# Patient Record
Sex: FEMALE | Race: WHITE | NOT HISPANIC OR LATINO
[De-identification: names, ages, dates, MRNs, and addresses within clinical notes are randomized per-mention and may not be internally consistent; named-entity substitution may affect disease eponyms.]

---

## 2023-04-18 PROBLEM — Z00.00 ENCOUNTER FOR PREVENTIVE HEALTH EXAMINATION: Status: ACTIVE | Noted: 2023-04-18

## 2023-04-24 ENCOUNTER — NON-APPOINTMENT (OUTPATIENT)
Age: 33
End: 2023-04-24

## 2023-04-24 ENCOUNTER — APPOINTMENT (OUTPATIENT)
Dept: HEART AND VASCULAR | Facility: CLINIC | Age: 33
End: 2023-04-24
Payer: MEDICAID

## 2023-04-24 VITALS
SYSTOLIC BLOOD PRESSURE: 122 MMHG | BODY MASS INDEX: 18.78 KG/M2 | RESPIRATION RATE: 14 BRPM | HEIGHT: 64 IN | OXYGEN SATURATION: 100 % | HEART RATE: 80 BPM | WEIGHT: 110 LBS | DIASTOLIC BLOOD PRESSURE: 77 MMHG

## 2023-04-24 DIAGNOSIS — R94.31 ABNORMAL ELECTROCARDIOGRAM [ECG] [EKG]: ICD-10-CM

## 2023-04-24 PROCEDURE — 93000 ELECTROCARDIOGRAM COMPLETE: CPT

## 2023-04-24 PROCEDURE — 99203 OFFICE O/P NEW LOW 30 MIN: CPT | Mod: 25

## 2023-04-24 NOTE — HISTORY OF PRESENT ILLNESS
[FreeTextEntry1] : ENDY IBARRA is a 32 year y.o. F with medical history significant for hyperthyroidism and anxiety disorder.\par She is here for cardiac evaluation of palpitations\par She reports having palpitations for >1 yr. \par She states she recently saw her endocrinologist who said her thyroid levels were okay. \par She would like to know whether it is okay for her to take beta blockers. \par Symptoms occur almost daily. Sometimes lasts a few minutes but sometimes can last up to hours. A/w LH. No syncope. Usually associated with feeling anxious. \par Denies CP, SOB, orthopnea, dizziness, syncope, LH, CAMPBELL, edema \par Patient denies changes in her exercise tolerance during the past 6 months. She can walk >10 blocks and can climb 3-4 flights of stairs. \par Sleeps with 1 pillow\par \par She denies history of MI, CVA, DM. \par Denies family history of premature ASCVD and /or SCD\par \par No hospitalizations in the past 3 years.\par \par \par DATA \par ECG (4/24/23): NSR at 87 bpm w nl axis, short Berny and subtle nonspecific ST depressions\par \par

## 2023-04-24 NOTE — ASSESSMENT
[FreeTextEntry1] : ENDY IBARRA is a 32 year F with past medical history significant for hyperthyroidism and anxiety. She is here for cardiac evaluation of palpitations, as described above. \par - I reviewed ECG \par - check cbc, cmp, TSH, \par - place a holter x 1 wk\par - Schedule an echo to evaluate for structural heart disease\par - Increase ambulation as tolerated to aim for approximately 30 minutes of moderate activity 5 days a week.  Heart healthy diet low in sodium, sugars and saturated fats and high in fruits, vegetables and whole grains.  \par - I counseled her on stress/anxiety management \par \par \par Patient advised to go to the nearest ED whenever any symptoms persist and/or worsens.  Patient/Family/Caregiver verbalized complete understanding of these instructions.\par \par I spent a total of 45 minutes with more than 50% spent on counseling and coordinating care.\par \par RTO after test results are available \par

## 2023-04-24 NOTE — PHYSICAL EXAM
[Well Developed] : well developed [Well Nourished] : well nourished [No Acute Distress] : no acute distress [Normal Venous Pressure] : normal venous pressure [No Carotid Bruit] : no carotid bruit [Normal S1, S2] : normal S1, S2 [No Murmur] : no murmur [No Rub] : no rub [No Gallop] : no gallop [Clear Lung Fields] : clear lung fields [Good Air Entry] : good air entry [No Respiratory Distress] : no respiratory distress  [Normal Gait] : normal gait [No Edema] : no edema [No Cyanosis] : no cyanosis [No Clubbing] : no clubbing [Moves all extremities] : moves all extremities [No Focal Deficits] : no focal deficits [Normal Speech] : normal speech [Alert and Oriented] : alert and oriented [Normal memory] : normal memory [Appears Anxious] : appears anxious

## 2023-04-24 NOTE — REVIEW OF SYSTEMS
[Palpitations] : palpitations [Fever] : no fever [Headache] : no headache [Chills] : no chills [Feeling Fatigued] : not feeling fatigued [SOB] : no shortness of breath [Dyspnea on exertion] : not dyspnea during exertion [Chest Discomfort] : no chest discomfort [Lower Ext Edema] : no extremity edema [Leg Claudication] : no intermittent leg claudication [Orthopnea] : no orthopnea [PND] : no PND [Syncope] : no syncope [Cough] : no cough [Wheezing] : no wheezing [Coughing Up Blood] : no hemoptysis [Convulsions] : no convulsions [Dizziness] : no dizziness [Limb Weakness (Paresis)] : no limb weakness (Paresis) [Confusion] : no confusion was observed [Easy Bleeding] : no tendency for easy bleeding

## 2023-05-08 ENCOUNTER — TRANSCRIPTION ENCOUNTER (OUTPATIENT)
Age: 33
End: 2023-05-08

## 2023-05-19 ENCOUNTER — OUTPATIENT (OUTPATIENT)
Dept: OUTPATIENT SERVICES | Facility: HOSPITAL | Age: 33
LOS: 1 days | End: 2023-05-19
Payer: MEDICAID

## 2023-05-19 DIAGNOSIS — R94.31 ABNORMAL ELECTROCARDIOGRAM [ECG] [EKG]: ICD-10-CM

## 2023-05-19 DIAGNOSIS — R00.2 PALPITATIONS: ICD-10-CM

## 2023-05-19 PROCEDURE — 93306 TTE W/DOPPLER COMPLETE: CPT

## 2023-05-19 PROCEDURE — 93306 TTE W/DOPPLER COMPLETE: CPT | Mod: 26

## 2023-05-19 PROCEDURE — 76377 3D RENDER W/INTRP POSTPROCES: CPT | Mod: 26

## 2023-05-22 ENCOUNTER — APPOINTMENT (OUTPATIENT)
Dept: HEART AND VASCULAR | Facility: CLINIC | Age: 33
End: 2023-05-22
Payer: MEDICAID

## 2023-05-22 VITALS
HEART RATE: 96 BPM | OXYGEN SATURATION: 100 % | RESPIRATION RATE: 14 BRPM | DIASTOLIC BLOOD PRESSURE: 80 MMHG | SYSTOLIC BLOOD PRESSURE: 119 MMHG

## 2023-05-22 DIAGNOSIS — E07.9 DISORDER OF THYROID, UNSPECIFIED: ICD-10-CM

## 2023-05-22 DIAGNOSIS — F41.9 ANXIETY DISORDER, UNSPECIFIED: ICD-10-CM

## 2023-05-22 DIAGNOSIS — R00.2 PALPITATIONS: ICD-10-CM

## 2023-05-22 LAB
ALBUMIN SERPL ELPH-MCNC: 4.9 G/DL
ALP BLD-CCNC: 69 U/L
ALT SERPL-CCNC: 20 U/L
ANION GAP SERPL CALC-SCNC: 13 MMOL/L
AST SERPL-CCNC: 20 U/L
BILIRUB SERPL-MCNC: 0.6 MG/DL
BUN SERPL-MCNC: 10 MG/DL
CALCIUM SERPL-MCNC: 10.4 MG/DL
CHLORIDE SERPL-SCNC: 103 MMOL/L
CO2 SERPL-SCNC: 22 MMOL/L
CREAT SERPL-MCNC: 0.63 MG/DL
EGFR: 122 ML/MIN/1.73M2
GLUCOSE SERPL-MCNC: 107 MG/DL
HCT VFR BLD CALC: 42.8 %
HGB BLD-MCNC: 14.9 G/DL
MCHC RBC-ENTMCNC: 30.8 PG
MCHC RBC-ENTMCNC: 34.8 GM/DL
MCV RBC AUTO: 88.4 FL
PLATELET # BLD AUTO: 272 K/UL
POTASSIUM SERPL-SCNC: 4.5 MMOL/L
PROT SERPL-MCNC: 8 G/DL
RBC # BLD: 4.84 M/UL
RBC # FLD: 11.7 %
SODIUM SERPL-SCNC: 138 MMOL/L
TSH SERPL-ACNC: 0.01 UIU/ML
WBC # FLD AUTO: 5.88 K/UL

## 2023-05-22 PROCEDURE — 99213 OFFICE O/P EST LOW 20 MIN: CPT

## 2023-05-22 NOTE — REVIEW OF SYSTEMS
[Palpitations] : palpitations [Anxiety] : anxiety [Fever] : no fever [Headache] : no headache [Chills] : no chills [Feeling Fatigued] : not feeling fatigued [Dyspnea on exertion] : not dyspnea during exertion [SOB] : no shortness of breath [Chest Discomfort] : no chest discomfort [Lower Ext Edema] : no extremity edema [Leg Claudication] : no intermittent leg claudication [Orthopnea] : no orthopnea [PND] : no PND [Syncope] : no syncope [Cough] : no cough [Wheezing] : no wheezing [Coughing Up Blood] : no hemoptysis [Dizziness] : no dizziness [Convulsions] : no convulsions [Limb Weakness (Paresis)] : no limb weakness (Paresis) [Confusion] : no confusion was observed [Under Stress] : not under stress [Suicidal] : not suicidal [Easy Bleeding] : no tendency for easy bleeding

## 2023-05-22 NOTE — ASSESSMENT
[FreeTextEntry1] : ENDY IBARRA is a 32 year F with past medical history significant for hyperthyroidism and anxiety. She is here for follow-up of palpitations. Holter is essentially normal. She had an echo that showed a structurally normal heart . Recent labs are remarkable for low TSH. \par - I reviewed ECG \par - I reviewed labs, \par - I reviewed holter report and discussed the results with the patient \par - I reviewed echo report and discussed the results with the patient \par - she is scheduled to see endo next week.  Check TFTs \par - I counseled her on stress/anxiety management \par - Increase ambulation as tolerated to aim for approximately 30 minutes of moderate activity 5 days a week.  Heart healthy diet low in sodium, sugars and saturated fats and high in fruits, vegetables and whole grains.  \par \par \par Patient advised to go to the nearest ED whenever any symptoms persist and/or worsens.  Patient/Family/Caregiver verbalized complete understanding of these instructions.\par \par I spent a total of 20 minutes with more than 50% spent on counseling and coordinating care.\par

## 2023-05-22 NOTE — HISTORY OF PRESENT ILLNESS
[FreeTextEntry1] : ENDY IBARRA is a 32 year y.o. F with medical history significant for hyperthyroidism and anxiety disorder.\par She is here for follow-up of palpitations\par Holter is essentially normal\par Echo showed she has normal LV function and no significant valve disease\par Recent labs are remarkable for low TSH levels. She is scheduled to see her endocrinologist next week. \par \par Denies CP, SOB, orthopnea, dizziness, syncope, LH, CAMPBELL, edema \par Patient denies changes in her exercise tolerance during the past 6 months. She can walk >10 blocks and can climb 3-4 flights of stairs. \par Sleeps with 1 pillow\par \par She denies history of MI, CVA, DM. \par Denies family history of premature ASCVD and /or SCD\par \par No hospitalizations in the past 3 years.\par \par \par DATA \par ECHO (5/19/23): Nl LV and RV size and function. EF 60-65%. NTrace MR and TR. Normal PASP. 14mmHg. No effusion \par \par ECG (4/24/23): NSR at 87 bpm w nl axis, short Berny and subtle nonspecific ST depressions\par \par Holter x 7 d (4/24/23); Min 53 bpm, Avg 90 bpm, Max 178 bpm. Rare PVC and PACs. No runs or pauses. \par \par LABS (4/24/23): Hgb 14.9; Hct 42.8; TSH 0.01; K 4.5; Cre 0.63; LFTs wnl; eGFR 122; \par \par